# Patient Record
Sex: MALE
[De-identification: names, ages, dates, MRNs, and addresses within clinical notes are randomized per-mention and may not be internally consistent; named-entity substitution may affect disease eponyms.]

---

## 2019-03-25 ENCOUNTER — HOSPITAL ENCOUNTER (OUTPATIENT)
Dept: HOSPITAL 92 - BICMAMMO | Age: 44
Discharge: HOME | End: 2019-03-25
Attending: FAMILY MEDICINE
Payer: COMMERCIAL

## 2019-03-25 DIAGNOSIS — N63.11: Primary | ICD-10-CM

## 2019-03-25 PROCEDURE — 77066 DX MAMMO INCL CAD BI: CPT

## 2019-03-25 PROCEDURE — G0279 TOMOSYNTHESIS, MAMMO: HCPCS

## 2019-03-25 NOTE — ULT
RIGHT BREAST ULTRASOUND:

 

HISTORY:

Palpable painful mass at the 10 o'clock position of the right breast.

 

TECHNIQUE:

Multiplanar gray-scale and color Doppler images were obtained in a right breast ultrasound.

 

FINDINGS:

At the 10 o'clock position of the right breast, approximately 1 cm from the nipple, normal appearing 
tissue was seen in the subcutaneous tissues.  No gynecomastia is seen.  No solid mass or fluid collec
tion is identified.

 

IMPRESSION:

BI-RADS category 1-Negative.

 

POS: LUANA

## 2019-04-10 NOTE — MMO
Bilateral MAMMO Bilat Diag [facility].

 

CLINICAL HISTORY:

Patient is 43 years old and is seen for diagnostic exam and lump or thickening

in the right breast. The patient has no family history of breast cancer.  The

patient has no personal history of cancer.

 

VIEWS:

The views performed were:  bilateral craniocaudal with tomosynthesis; bilateral

mediolateral oblique with tomosynthesis; and bilateral mediolateral.

 

FILMS COMPARED:

The present examination has been compared to a prior imaging study performed at

[facility] on 03/25/2019.

 

MAMMOGRAM FINDINGS:

The breasts are almost entirely fat.

 

There are no suspicious masses, suspicious calcifications, or new areas of

architectural distortion.

 

IMPRESSION:

THERE IS NO MAMMOGRAPHIC EVIDENCE OF MALIGNANCY.

 

THE RESULTS OF THIS EXAM WERE SENT TO THE PATIENT.

 

ACR BI-RADS Category 1 - Negative

 

MAMMOGRAPHY NOTE:

 1. A negative mammogram report should not delay a biopsy if a dominant of

 clinically suspicious mass is present.

 2. Approximately 10% to 15% of breast cancers are not detected by

 mammography.

 3. Adenosis and dense breasts may obscure an underlying neoplasm.